# Patient Record
Sex: FEMALE | Race: WHITE | NOT HISPANIC OR LATINO | Employment: FULL TIME | URBAN - METROPOLITAN AREA
[De-identification: names, ages, dates, MRNs, and addresses within clinical notes are randomized per-mention and may not be internally consistent; named-entity substitution may affect disease eponyms.]

---
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
## 2022-04-08 ENCOUNTER — HOSPITAL ENCOUNTER (OUTPATIENT)
Dept: RADIOLOGY | Facility: MEDICAL CENTER | Age: 22
End: 2022-04-08
Attending: NURSE PRACTITIONER
Payer: COMMERCIAL

## 2022-04-08 ENCOUNTER — OFFICE VISIT (OUTPATIENT)
Dept: URGENT CARE | Facility: PHYSICIAN GROUP | Age: 22
End: 2022-04-08
Payer: COMMERCIAL

## 2022-04-08 VITALS
DIASTOLIC BLOOD PRESSURE: 82 MMHG | OXYGEN SATURATION: 98 % | BODY MASS INDEX: 28.04 KG/M2 | WEIGHT: 185 LBS | TEMPERATURE: 97.6 F | RESPIRATION RATE: 15 BRPM | HEART RATE: 69 BPM | HEIGHT: 68 IN | SYSTOLIC BLOOD PRESSURE: 128 MMHG

## 2022-04-08 DIAGNOSIS — M79.672 ACUTE FOOT PAIN, LEFT: ICD-10-CM

## 2022-04-08 DIAGNOSIS — S96.912A STRAIN OF LEFT FOOT, INITIAL ENCOUNTER: ICD-10-CM

## 2022-04-08 DIAGNOSIS — M25.572 ACUTE LEFT ANKLE PAIN: ICD-10-CM

## 2022-04-08 DIAGNOSIS — S93.402A SPRAIN OF LEFT ANKLE, UNSPECIFIED LIGAMENT, INITIAL ENCOUNTER: ICD-10-CM

## 2022-04-08 PROCEDURE — 99214 OFFICE O/P EST MOD 30 MIN: CPT | Performed by: NURSE PRACTITIONER

## 2022-04-08 PROCEDURE — 73610 X-RAY EXAM OF ANKLE: CPT | Mod: LT

## 2022-04-08 PROCEDURE — 73630 X-RAY EXAM OF FOOT: CPT | Mod: LT

## 2022-04-08 RX ORDER — IBUPROFEN 800 MG/1
800 TABLET ORAL EVERY 8 HOURS PRN
Qty: 30 TABLET | Refills: 0 | Status: SHIPPED | OUTPATIENT
Start: 2022-04-08

## 2022-04-08 ASSESSMENT — ENCOUNTER SYMPTOMS
WEAKNESS: 0
CONSTITUTIONAL NEGATIVE: 1
SENSORY CHANGE: 0
NEUROLOGICAL NEGATIVE: 1

## 2022-04-08 ASSESSMENT — VISUAL ACUITY: OU: 1

## 2022-04-08 NOTE — PROGRESS NOTES
Subjective:     Teresita Miguel is a 21 y.o. female who presents for PPD Reading and Ankle Pain (Jumped and landed on heel last night 9pm.)       Ankle Pain   She has tried nothing for the symptoms.      Patient reports yesterday, she jumped straight up and landed straight down onto her left foot.  Was wearing cleats.  Landed on some hard dirt.  Reports she landed with straight legs.  Reports pain, tenderness, swelling, and decreased range of motion at her left foot and ankle.  Walking with a limp.    Patient was screened prior to rooming and denied COVID-19 diagnosis or contact with a person who has been diagnosed or is suspected to have COVID-19. During this visit, appropriate PPE was worn, hand hygiene was performed, and the patient and any visitors were masked.     PMH:  has no past medical history on file.    MEDS:   Current Outpatient Medications:   •  ibuprofen (MOTRIN) 800 MG Tab, Take 1 Tablet by mouth every 8 hours as needed for Moderate Pain, Fever or Inflammation., Disp: 30 Tablet, Rfl: 0  •  benzonatate (TESSALON) 200 MG capsule, Take 1 Cap by mouth 3 times a day as needed for Cough. (Patient not taking: Reported on 4/8/2022), Disp: 45 Cap, Rfl: 0  •  azithromycin (ZITHROMAX) 250 MG Tab, Take as directed (Patient not taking: Reported on 4/8/2022), Disp: 6 Tab, Rfl: 0  •  amoxicillin (AMOXIL) 500 MG Cap, Take 1 Cap by mouth 3 times a day. (Patient not taking: Reported on 4/8/2022), Disp: 30 Cap, Rfl: 0  •  ondansetron (ZOFRAN ODT) 4 MG TABLET DISPERSIBLE, 1 TAB, ALLOW TO DISINTEGRATE IN MOUTH, EVERY 8 HOURS ONLY IF NEEDED FOR NAUSEA OR VOMITING. (Patient not taking: Reported on 4/8/2022), Disp: 15 Tab, Rfl: 0    ALLERGIES: No Known Allergies    SURGHX: History reviewed. No pertinent surgical history.    SOCHX:  reports that she has never smoked. She has never used smokeless tobacco. She reports that she does not drink alcohol and does not use drugs.     FH: Reviewed with patient, not pertinent to this  "visit.    Review of Systems   Constitutional: Negative.    Musculoskeletal:        Left foot, ankle pain   Neurological: Negative.  Negative for sensory change and weakness.   All other systems reviewed and are negative.    Additional details per HPI.      Objective:     /82 (BP Location: Left arm, Patient Position: Sitting, BP Cuff Size: Adult)   Pulse 69   Temp 36.4 °C (97.6 °F) (Tympanic)   Resp 15   Ht 1.727 m (5' 8\")   Wt 83.9 kg (185 lb)   SpO2 98%   BMI 28.13 kg/m²     Physical Exam  Vitals reviewed.   Constitutional:       General: She is not in acute distress.     Appearance: She is well-developed. She is not ill-appearing or toxic-appearing.   Eyes:      General: Vision grossly intact.      Extraocular Movements: Extraocular movements intact.   Cardiovascular:      Rate and Rhythm: Normal rate.      Pulses: Normal pulses.   Pulmonary:      Effort: Pulmonary effort is normal. No respiratory distress.   Musculoskeletal:         General: No deformity.      Cervical back: Normal range of motion.      Left lower leg: No swelling, deformity, lacerations or tenderness.      Left ankle: Swelling present. No deformity, ecchymosis or lacerations. Tenderness (Anterior) present over the lateral malleolus. Decreased range of motion.      Left foot: Decreased range of motion. Normal capillary refill. Swelling and tenderness (Diffuse, including dorsal plantar aspects and heel) present. No deformity or laceration.   Skin:     General: Skin is warm and dry.      Capillary Refill: Capillary refill takes less than 2 seconds.      Coloration: Skin is not pale.      Findings: No bruising, erythema or rash.   Neurological:      Mental Status: She is alert and oriented to person, place, and time.      Sensory: No sensory deficit.      Motor: No weakness.   Psychiatric:         Behavior: Behavior normal. Behavior is cooperative.     X-ray of left ankle:    Details    Reading Physician Reading Date Result Priority "   Nela Wynn M.D.  [phone] 4/8/2022 Urgent Care     Narrative & Impression     4/8/2022 3:21 PM     HISTORY/REASON FOR EXAM:  Left ankle pain after twisting injury yesterday    TECHNIQUE/EXAM DESCRIPTION AND NUMBER OF VIEWS:  3 views of the LEFT ankle.     COMPARISON: Left foot same time     FINDINGS:     There is lateral left ankle soft tissue swelling.     There is no displaced fracture or dislocation.     The alignment of the ankle is within normal limits.    IMPRESSION:     1.  There is lateral left ankle swelling. There is no underlying fracture.           Exam Ended: 04/08/22  3:22 PM Last Resulted: 04/08/22  3:25 PM           X-ray of left foot:    Details    Reading Physician Reading Date Result Priority   Nela Wynn M.D.  [phone] 4/8/2022 Urgent Care     Narrative & Impression     4/8/2022 3:21 PM     HISTORY/REASON FOR EXAM:  Left foot pain after twisting injury yesterday    TECHNIQUE/EXAM DESCRIPTION AND NUMBER OF VIEWS:  3 views of the LEFT foot.     COMPARISON:  None     FINDINGS:     There is no focal soft tissue swelling.     There is no evidence of displaced fracture or dislocation.     The alignment is maintained.    IMPRESSION:     1.  There is no displaced left foot fracture.           Exam Ended: 04/08/22  3:21 PM Last Resulted: 04/08/22  3:26 PM           Radiology report and images reviewed by myself. Concur with findings.      Assessment/Plan:     1. Sprain of left ankle, unspecified ligament, initial encounter  - DX-ANKLE 3+ VIEWS LEFT; Future  - ibuprofen (MOTRIN) 800 MG Tab; Take 1 Tablet by mouth every 8 hours as needed for Moderate Pain, Fever or Inflammation.  Dispense: 30 Tablet; Refill: 0  - Referral to Sports Medicine    2. Strain of left foot, initial encounter  - DX-FOOT-COMPLETE 3+ LEFT; Future  - ibuprofen (MOTRIN) 800 MG Tab; Take 1 Tablet by mouth every 8 hours as needed for Moderate Pain, Fever or Inflammation.  Dispense: 30 Tablet; Refill: 0  - Referral to  Sports Medicine    Rx as above sent electronically.    Rest, ice, compression, and elevation (RICE) and over-the-counter acetaminophen alternating with ibuprofen, per 's instructions, as needed for pain.  Tall walking boot applied.  Crutches provided.  Work note provided.    Recommend following up with sports medicine.  Referral placed.    Differential diagnosis, natural history, supportive care, over-the-counter symptom management per 's instructions, close monitoring, and indications for immediate follow-up discussed.     All questions answered. Patient agrees with the plan of care.    Discharge summary provided.    Work note provided.

## 2022-04-08 NOTE — PATIENT INSTRUCTIONS
Details    Reading Physician Reading Date Result Priority   Nela Wynn M.D.  [phone] 4/8/2022 Urgent Care     Narrative & Impression     4/8/2022 3:21 PM     HISTORY/REASON FOR EXAM:  Left ankle pain after twisting injury yesterday        TECHNIQUE/EXAM DESCRIPTION AND NUMBER OF VIEWS:  3 views of the LEFT ankle.     COMPARISON: Left foot same time     FINDINGS:     There is lateral left ankle soft tissue swelling.     There is no displaced fracture or dislocation.     The alignment of the ankle is within normal limits.           IMPRESSION:     1.  There is lateral left ankle swelling. There is no underlying fracture.             Exam Ended: 04/08/22  3:22 PM Last Resulted: 04/08/22  3:25 PM               Details    Reading Physician Reading Date Result Priority   Nela Wynn M.D.  [phone] 4/8/2022 Urgent Care     Narrative & Impression     4/8/2022 3:21 PM     HISTORY/REASON FOR EXAM:  Left foot pain after twisting injury yesterday        TECHNIQUE/EXAM DESCRIPTION AND NUMBER OF VIEWS:  3 views of the LEFT foot.     COMPARISON:  None     FINDINGS:     There is no focal soft tissue swelling.     There is no evidence of displaced fracture or dislocation.     The alignment is maintained.           IMPRESSION:     1.  There is no displaced left foot fracture.             Exam Ended: 04/08/22  3:21 PM Last Resulted: 04/08/22  3:26 PM                   Ankle Sprain    An ankle sprain is a stretch or tear in a ligament in the ankle. Ligaments are tissues that connect bones to each other.  The two most common types of ankle sprains are:  · Inversion sprain. This happens when the foot turns inward and the ankle rolls outward. It affects the ligament on the outside of the foot (lateral ligament).  · Eversion sprain. This happens when the foot turns outward and the ankle rolls inward. It affects the ligament on the inner side of the foot (medial ligament).  What are the causes?  This condition is often  caused by accidentally rolling or twisting the ankle.  What increases the risk?  You are more likely to develop this condition if you play sports.  What are the signs or symptoms?  Symptoms of this condition include:  · Pain in your ankle.  · Swelling.  · Bruising. This may develop right after you sprain your ankle or 1-2 days later.  · Trouble standing or walking, especially when you turn or change directions.  How is this diagnosed?  This condition is diagnosed with:  · A physical exam. During the exam, your health care provider will press on certain parts of your foot and ankle and try to move them in certain ways.  · X-ray imaging. These may be taken to see how severe the sprain is and to check for broken bones.  How is this treated?  This condition may be treated with:  · A brace or splint. This is used to keep the ankle from moving until it heals.  · An elastic bandage. This is used to support the ankle.  · Crutches.  · Pain medicine.  · Surgery. This may be needed if the sprain is severe.  · Physical therapy. This may help to improve the range of motion in the ankle.  Follow these instructions at home:  If you have a brace or a splint:  · Wear the brace or splint as told by your health care provider. Remove it only as told by your health care provider.  · Loosen the brace or splint if your toes tingle, become numb, or turn cold and blue.  · Keep the brace or splint clean.  · If the brace or splint is not waterproof:  ? Do not let it get wet.  ? Cover it with a watertight covering when you take a bath or a shower.  If you have an elastic bandage (dressing):  · Remove it to shower or bathe.  · Try not to move your ankle much, but wiggle your toes from time to time. This helps to prevent swelling.  · Adjust the dressing to make it more comfortable if it feels too tight.  · Loosen the dressing if you have numbness or tingling in your foot, or if your foot becomes cold and blue.  Managing pain, stiffness, and  swelling    · Take over-the-counter and prescription medicines only as told by your health care provider.  · For 2-3 days, keep your ankle raised (elevated) above the level of your heart as much as possible.  · If directed, put ice on the injured area:  ? If you have a removable brace or splint, remove it as told by your health care provider.  ? Put ice in a plastic bag.  ? Place a towel between your skin and the bag.  ? Leave the ice on for 20 minutes, 2-3 times a day.  General instructions  · Rest your ankle.  · Do not use the injured limb to support your body weight until your health care provider says that you can. Use crutches as told by your health care provider.  · Do not use any products that contain nicotine or tobacco, such as cigarettes, e-cigarettes, and chewing tobacco. If you need help quitting, ask your health care provider.  · Keep all follow-up visits as told by your health care provider. This is important.  Contact a health care provider if:  · You have rapidly increasing bruising or swelling.  · Your pain is not relieved with medicine.  Get help right away if:  · Your foot or toes become numb or blue.  · You have severe pain that gets worse.  Summary  · An ankle sprain is a stretch or tear in a ligament in the ankle. Ligaments are tissues that connect bones to each other.  · This condition is often caused by accidentally rolling or twisting the ankle.  · Symptoms include pain, swelling, bruising, and trouble walking.  · To relieve pain and swelling, put ice on the affected ankle, raise your ankle above the level of your heart, and use an elastic bandage.  · Keep all follow-up visits as told by your health care provider. This is important.  This information is not intended to replace advice given to you by your health care provider. Make sure you discuss any questions you have with your health care provider.  Document Released: 12/18/2006 Document Revised: 09/09/2019 Document Reviewed:  05/14/2019  Elsevier Patient Education © 2020 [employer]          Foot Sprain    A foot sprain is an injury to one of the strong bands of tissue (ligaments) that connect and support the bones in your feet. The ligament can be stretched too much and tear. A tear can be either partial or complete. The severity of the sprain depends on how much of the ligament was damaged or torn.  What are the causes?  This condition is usually caused by suddenly twisting or pivoting your foot.  What increases the risk?  This injury is more likely to occur in people who:  · Play a sport, such as basketball or football.  · Exercise or play a sport without warming up.  · Start a new workout or sport.  · Suddenly increase how long or hard they exercise or play a sport.  · Have previously injured their foot or ankle.  What are the signs or symptoms?  Symptoms of this condition start soon after an injury and include:  · Pain, especially in the arch of the foot.  · Bruising.  · Swelling.  · Inability to walk or use the foot to support body weight.  How is this diagnosed?  This condition is diagnosed with a medical history and physical exam. You may also have imaging tests, such as:  · X-rays to make sure there are no broken bones (fractures).  · An MRI to see if the ligament is torn.  How is this treated?  Treatment for this condition depends on the severity of the sprain.  · Mild sprains can be treated with:  ? Rest, ice, compression, and elevation (RICE).  ? Keeping your foot in a fixed position (immobilization) for a period of time. This is done if your ligament is overstretched or partially torn. Your health care provider will apply a bandage, splint, or walking boot to keep your foot from moving until it heals.  ? Using crutches or a scooter for a few weeks to avoid putting weight on your foot while it is healing.  · Major sprains can be treated with:  ? Surgery. This is done if your ligament is fully torn and a procedure is needed  to reconnect it to the bone.  ? A cast or splint. This will be needed after surgery. A cast or splint will need to stay on your foot while it heals.  · In both types of sprains, you may need to exercise or have physical therapy to strengthen your foot.  Follow these instructions at home:  If you have a bandage, splint, or boot:  · Wear the bandage, splint, or boot as told by your health care provider. Remove only as told by your health care provider.  · Loosen the bandage, splint, or boot if your toes tingle, become numb, or turn cold and blue.  · Keep the bandage, splint, or boot clean and dry.  If you have a cast:  · Do not stick anything inside the cast to scratch your skin. Doing that increases your risk for infection.  · Check the skin around the cast every day. Tell your health care provider about any concerns.  · You may put lotion on dry skin around the edges of the cast. Do not put lotion on the skin underneath the cast.  · Keep the cast clean and dry.  Bathing  · Do not take baths, swim, or use a hot tub until your health care provider approves. Ask your health care provider if you may take showers. You may only be allowed to take sponge baths.  · If the bandage, splint, boot, or cast is not waterproof:  ? Do not let it get wet.  ? Cover it with a watertight covering when you take a shower.  Managing pain, stiffness, and swelling    · If directed, put ice on the injured area:  ? If you have a removable splint, boot, or immobilizer, remove it as told by your health care provider.  ? Put ice in a plastic bag.  ? Place a towel between your skin and the bag.  ? Leave the ice on for 20 minutes, 2-3 times per day.  · Move your toes often to avoid stiffness and to lessen swelling.  · Raise (elevate) the injured area above the level of your heart while you are sitting or lying down.  Driving  · Do not drive or operate heavy machinery while taking pain medicine.  · Ask your health care provider when it is safe to  drive if you have a bandage, splint, or walking boot on your foot.  Activity  · Do not use the injured foot to support your body weight until your health care provider says that you can. Use crutches or other supportive devices as directed by your health care provider.  · Ask your health care provider what activities are safe for you. Do any exercise or physical therapy as directed.  · Gradually increase how much and how far you walk until your health care provider says it is safe to return to full activity.  General instructions  · If you have a cast, do not put pressure on any part of it until it is fully hardened. This may take several hours.  · Take over-the-counter and prescription medicines only as told by your health care provider.  · When you can walk without pain, wear supportive shoes that have stiff soles. Do not wear flip-flops, and do not walk barefoot.  · Keep all follow-up visits as told by your health care provider. This is important.  Contact a health care provider if:  · Your pain is not controlled with medicine.  · Your bruising or swelling gets worse or does not get better with treatment.  · Your splint, boot, or cast is damaged.  Get help right away if:  · You develop severe numbness or tingling in your foot.  · Your foot turns blue, white, or gray, and it feels cold.  Summary  · A foot sprain is an injury to one of the strong bands of tissue (ligaments) that connect and support the bones in your feet.  · Your health care provider may recommend a splint or boot for your foot to support it while it heals. In some cases, surgery may be needed.  · Physical therapy can help keep your other muscles strong until your foot gets better.  This information is not intended to replace advice given to you by your health care provider. Make sure you discuss any questions you have with your health care provider.  Document Released: 06/09/2003 Document Revised: 12/22/2018 Document Reviewed: 12/22/2018  [employer] Offer  Patient Education © 2020 [employer]

## 2022-04-08 NOTE — LETTER
April 8, 2022         Patient: Teresita Miguel   YOB: 2000   Date of Visit: 4/8/2022           To Whom it May Concern:    Teresita Miguel was seen in my clinic on 4/8/2022 due to illness/injury. Due to medical necessity, please excuse patient from work for up to the next 3 days as needed.     If you have any questions or concerns, please don't hesitate to call.        Sincerely,         GARY Vera.  Electronically Signed

## 2023-03-29 ENCOUNTER — OFFICE VISIT (OUTPATIENT)
Dept: URGENT CARE | Facility: PHYSICIAN GROUP | Age: 23
End: 2023-03-29
Payer: COMMERCIAL

## 2023-03-29 ENCOUNTER — HOSPITAL ENCOUNTER (OUTPATIENT)
Facility: MEDICAL CENTER | Age: 23
End: 2023-03-29
Attending: NURSE PRACTITIONER
Payer: COMMERCIAL

## 2023-03-29 VITALS
HEART RATE: 85 BPM | BODY MASS INDEX: 28.04 KG/M2 | TEMPERATURE: 97.2 F | OXYGEN SATURATION: 98 % | HEIGHT: 68 IN | RESPIRATION RATE: 14 BRPM | SYSTOLIC BLOOD PRESSURE: 122 MMHG | DIASTOLIC BLOOD PRESSURE: 80 MMHG | WEIGHT: 185 LBS

## 2023-03-29 DIAGNOSIS — Z20.2 EXPOSURE TO SEXUALLY TRANSMITTED DISEASE (STD): ICD-10-CM

## 2023-03-29 DIAGNOSIS — N30.00 ACUTE CYSTITIS WITHOUT HEMATURIA: ICD-10-CM

## 2023-03-29 PROCEDURE — 99213 OFFICE O/P EST LOW 20 MIN: CPT | Performed by: NURSE PRACTITIONER

## 2023-03-29 PROCEDURE — 87086 URINE CULTURE/COLONY COUNT: CPT

## 2023-03-29 RX ORDER — NITROFURANTOIN 25; 75 MG/1; MG/1
100 CAPSULE ORAL 2 TIMES DAILY
Qty: 10 CAPSULE | Refills: 0 | Status: SHIPPED | OUTPATIENT
Start: 2023-03-29 | End: 2023-04-03

## 2023-03-29 RX ORDER — PHENAZOPYRIDINE HYDROCHLORIDE 200 MG/1
TABLET, FILM COATED ORAL
Qty: 6 TABLET | Refills: 0 | Status: SHIPPED | OUTPATIENT
Start: 2023-03-29

## 2023-03-29 RX ORDER — ONDANSETRON 4 MG/1
4 TABLET, ORALLY DISINTEGRATING ORAL EVERY 6 HOURS PRN
Qty: 15 TABLET | Refills: 0 | Status: SHIPPED | OUTPATIENT
Start: 2023-03-29

## 2023-03-29 NOTE — LETTER
March 29, 2023       Patient: Teresita Miguel   YOB: 2000   Date of Visit: 3/29/2023         To Whom It May Concern:    In my medical opinion, I recommend that Teresita Miguel be excused from work today due to illness.     If you have any questions or concerns, please don't hesitate to call [phone]          Sincerely,          MENG [email or web address].[email or web address] EnriqueN.  Electronically Signed

## 2023-03-30 DIAGNOSIS — N30.00 ACUTE CYSTITIS WITHOUT HEMATURIA: ICD-10-CM

## 2023-03-30 LAB — AMBIGUOUS DTTM AMBI4: NORMAL

## 2023-03-31 ENCOUNTER — HOSPITAL ENCOUNTER (OUTPATIENT)
Dept: LAB | Facility: MEDICAL CENTER | Age: 23
End: 2023-03-31
Attending: NURSE PRACTITIONER
Payer: COMMERCIAL

## 2023-03-31 DIAGNOSIS — Z20.2 EXPOSURE TO SEXUALLY TRANSMITTED DISEASE (STD): ICD-10-CM

## 2023-03-31 LAB
C TRACH DNA SPEC QL NAA+PROBE: POSITIVE
HBV CORE AB SERPL QL IA: NONREACTIVE
HBV SURFACE AB SERPL IA-ACNC: 465 MIU/ML (ref 0–10)
HBV SURFACE AG SER QL: NORMAL
HCV AB SER QL: NORMAL
HIV 1+2 AB+HIV1 P24 AG SERPL QL IA: NORMAL
N GONORRHOEA DNA SPEC QL NAA+PROBE: NEGATIVE
SPECIMEN SOURCE: ABNORMAL
T PALLIDUM AB SER QL IA: NORMAL

## 2023-03-31 PROCEDURE — 86780 TREPONEMA PALLIDUM: CPT

## 2023-03-31 PROCEDURE — 86803 HEPATITIS C AB TEST: CPT

## 2023-03-31 PROCEDURE — 87591 N.GONORRHOEAE DNA AMP PROB: CPT

## 2023-03-31 PROCEDURE — 87340 HEPATITIS B SURFACE AG IA: CPT

## 2023-03-31 PROCEDURE — 36415 COLL VENOUS BLD VENIPUNCTURE: CPT

## 2023-03-31 PROCEDURE — 86704 HEP B CORE ANTIBODY TOTAL: CPT

## 2023-03-31 PROCEDURE — 86694 HERPES SIMPLEX NES ANTBDY: CPT

## 2023-03-31 PROCEDURE — 86706 HEP B SURFACE ANTIBODY: CPT

## 2023-03-31 PROCEDURE — 87389 HIV-1 AG W/HIV-1&-2 AB AG IA: CPT

## 2023-03-31 PROCEDURE — 87491 CHLMYD TRACH DNA AMP PROBE: CPT

## 2023-03-31 ASSESSMENT — ENCOUNTER SYMPTOMS
RESPIRATORY NEGATIVE: 1
EYES NEGATIVE: 1
VOMITING: 1
CARDIOVASCULAR NEGATIVE: 1
NAUSEA: 1
FEVER: 1
FLANK PAIN: 1
CHILLS: 1

## 2023-03-31 NOTE — PROGRESS NOTES
"Subjective:   Teresita Miguel is a 22 y.o. female who presents for Back Pain (X1 day ago: Sharp pain in back right below ribs, nauseous, dizzy, feverish, emesis, couldn't keep anything down. Bad pelvic pain as well. Still hurts this morning accompanied with pain when taking deep breaths. )      Patient presents with a chief complaint of right-sided low back pain, fever, nausea, and dizziness.  Her symptoms began yesterday.  She states that she is concerned she has a UTI.  She denies dysuria, endorses frequency and urgency.  She states she does not notice blood in her urine.  Additionally, she does want to undergo STI testing as her boyfriend has recently been unfaithful.  She denies any vaginal irritation, or discharge.  She denies any vaginal odor.  She denies vaginal lesions.      Review of Systems   Constitutional:  Positive for chills, fever and malaise/fatigue.   HENT: Negative.     Eyes: Negative.    Respiratory: Negative.     Cardiovascular: Negative.    Gastrointestinal:  Positive for nausea and vomiting.   Genitourinary:  Positive for flank pain, frequency and urgency. Negative for dysuria and hematuria.   Skin: Negative.      Medications, Allergies, and current problem list reviewed today in Epic.     Objective:     /80   Pulse 85   Temp 36.2 °C (97.2 °F)   Resp 14   Ht 1.727 m (5' 8\")   Wt 83.9 kg (185 lb)   SpO2 98%     Physical Exam  Vitals reviewed.   Constitutional:       Appearance: Normal appearance.   HENT:      Head: Normocephalic and atraumatic.      Nose: Nose normal.      Mouth/Throat:      Mouth: Mucous membranes are moist.      Pharynx: Oropharynx is clear.   Eyes:      Extraocular Movements: Extraocular movements intact.      Conjunctiva/sclera: Conjunctivae normal.      Pupils: Pupils are equal, round, and reactive to light.   Cardiovascular:      Rate and Rhythm: Normal rate and regular rhythm.   Pulmonary:      Effort: Pulmonary effort is normal.      Breath sounds: Normal " breath sounds.   Abdominal:      General: Abdomen is flat. Bowel sounds are normal.      Palpations: Abdomen is soft.      Tenderness: There is abdominal tenderness in the suprapubic area. There is right CVA tenderness and left CVA tenderness. There is no guarding or rebound. Negative signs include Holt's sign, Rovsing's sign, McBurney's sign and obturator sign.   Musculoskeletal:         General: Normal range of motion.      Cervical back: Normal range of motion and neck supple.   Skin:     General: Skin is warm and dry.      Capillary Refill: Capillary refill takes less than 2 seconds.   Neurological:      General: No focal deficit present.      Mental Status: She is alert.   Psychiatric:         Mood and Affect: Mood normal.         Behavior: Behavior normal.       Assessment/Plan:     Diagnosis and associated orders:     1. Acute cystitis without hematuria  nitrofurantoin (MACROBID) 100 MG Cap    ondansetron (ZOFRAN ODT) 4 MG TABLET DISPERSIBLE    phenazopyridine (PYRIDIUM) 200 MG Tab    POCT Urinalysis    URINE CULTURE(NEW)      2. Exposure to sexually transmitted disease (STD)  Chlamydia/GC, PCR (Urine)    HEP B SURFACE AB    HEP B SURFACE ANTIGEN    HEP C VIRUS ANTIBODY    HIV AG/AB COMBO ASSAY SCREENING    HEP B CORE AB TOTAL    T.PALLIDUM AB NATALIE (SCREENING)    HSV I/II IGG & IGM SERUM         Comments/MDM:     The patient's presenting symptoms and exam findings are consistent with a simple urinary tract infection. They are overall very well-appearing with normal vital signs and benign examination findings.   Increase fluid intake.  Urine culture: will call back only if positive and if necessary change in therapy.   Advised to return to the Urgent Care or follow up with their PCP if symptoms are not improving in 2-3 days or sooner if any worsening symptoms such as fever, chills, abdominal pain, back/flank pain, nausea, vomiting, or any other concerns.         Patient was given lab orders for STI testing.  She will be notified of her results via [employer].           Differential diagnosis, natural history, supportive care, and indications for immediate follow-up discussed.    Advised the patient to follow-up with the primary care physician for recheck, reevaluation, and consideration of further management.    Please note that this dictation was created using voice recognition software. I have made a reasonable attempt to correct obvious errors, but I expect that there are errors of grammar and possibly content that I did not discover before finalizing the note.    This note was electronically signed by JANELLE Martinez

## 2023-04-01 LAB
BACTERIA UR CULT: NORMAL
SIGNIFICANT IND 70042: NORMAL
SITE SITE: NORMAL
SOURCE SOURCE: NORMAL

## 2023-04-02 LAB
HSV1+2 IGG SER IA-ACNC: 2.38 IV
HSV1+2 IGM SER IA-ACNC: 1.21 IV

## 2023-04-03 DIAGNOSIS — A74.9 CHLAMYDIA: ICD-10-CM

## 2023-04-03 DIAGNOSIS — A59.9 TRICHOMONIASIS: ICD-10-CM

## 2023-04-03 RX ORDER — DOXYCYCLINE HYCLATE 100 MG
100 TABLET ORAL 2 TIMES DAILY
Qty: 14 TABLET | Refills: 0 | Status: SHIPPED | OUTPATIENT
Start: 2023-04-03 | End: 2023-04-10

## 2023-04-03 RX ORDER — METRONIDAZOLE 500 MG/1
500 TABLET ORAL 2 TIMES DAILY
Qty: 14 TABLET | Refills: 0 | Status: SHIPPED | OUTPATIENT
Start: 2023-04-03 | End: 2023-04-03